# Patient Record
Sex: MALE | Race: WHITE
[De-identification: names, ages, dates, MRNs, and addresses within clinical notes are randomized per-mention and may not be internally consistent; named-entity substitution may affect disease eponyms.]

---

## 2017-05-07 ENCOUNTER — HOSPITAL ENCOUNTER (EMERGENCY)
Dept: HOSPITAL 11 - JP.ED | Age: 32
Discharge: HOME | End: 2017-05-07
Payer: SELF-PAY

## 2017-05-07 VITALS — DIASTOLIC BLOOD PRESSURE: 95 MMHG | SYSTOLIC BLOOD PRESSURE: 143 MMHG

## 2017-05-07 DIAGNOSIS — Z98.890: ICD-10-CM

## 2017-05-07 DIAGNOSIS — J01.90: Primary | ICD-10-CM

## 2017-05-07 NOTE — EDM.PDOC
ED HPI GENERAL MEDICAL PROBLEM





- General


Chief Complaint: General


Stated Complaint: SINUS INFECTION?


Time Seen by Provider: 05/07/17 17:42


Source of Information: Reports: Patient


History Limitations: Reports: No limitations





- History of Present Illness


INITIAL COMMENTS - FREE TEXT/NARRATIVE: 


Pt reports approx 5 days of worsening of headache behind the eyes, sinus 

pressure. Pt worried about leaving for trip tomorrow and not able to have 

something for treatment. Needs antibiotics less than 1 time per year for the 

same. No fever, sore throat, ear pain or vision changes. Intermittent 

productive cough.


Onset: gradual


Duration: Day(s):, Getting worse


Location: Reports: head, face


Quality: Reports: Pressure, Same as previous episode


Severity: mild


Improves with: Reports: Medication (ibuprofen helps headache but does not 

relieve remainder of symptoms.)


Associated Symptoms: Reports: denies other symptoms


Treatments PTA: Reports: NSAIDS


  ** denies


Pain Score (Numeric/FACES): 0





- Related Data


 Allergies











Allergy/AdvReac Type Severity Reaction Status Date / Time


 


No Known Allergies Allergy   Verified 05/07/17 17:39











Home Meds: 


 Home Meds





NK [No Known Home Meds]  06/21/14 [History]











Past Medical History





- Past Surgical History


GI Surgical History: Reports: Hernia repair/other





Social & Family History





- Tobacco Use


Smoking Status *Q: Never Smoker


Years of Tobacco use: 8


Second Hand Smoke Exposure: No





- Alcohol Use


Days Per Week of Alcohol Use: 0


Number of Drinks Per Day: 4


Total Drinks Per Week: 0





- Recreational Drug Use


Recreational Drug Use: No





ED ROS GENERAL





- Review of Systems


Review Of Systems: ROS reveals no pertinent complaints other than HPI.





ED EXAM, GENERAL





- Physical Exam


Exam: See Below


Exam Limited By: No limitations


General Appearance: alert, WD/WN, no apparent distress


Eye Exam: bilateral eye: normal inspection, PERRL


Ears: normal external exam, normal canal, hearing grossly normal, normal TMs


Nose: nasal drainage, other (post nasal drainage).  No: normal mucosa


Throat/Mouth: Normal inspection, Normal teeth, Normal oropharynx, Normal voice, 

No airway compromise


Head: atraumatic, normocephalic, other (allergic shiners noted)


Neck: normal inspection, supple, non-tender, full range of motion


Respiratory/Chest: no respiratory distress, lungs clear, normal breath sounds


Cardiovascular: regular rate, rhythm


Neurological: alert, oriented, CN II-XII intact, normal cognition, normal gait, 

no motor/sensory deficits


Psychiatric: normal affect, normal mood


Skin Exam: Warm, Dry, Intact





Course





- Vital Signs


Last Recorded V/S: 


 Last Vital Signs











Temp  36.7 C   05/07/17 17:32


 


Pulse  75   05/07/17 17:32


 


Resp  16   05/07/17 17:32


 


BP  143/95 H  05/07/17 17:32


 


Pulse Ox  98   05/07/17 17:32














Departure





- Departure


Time of Disposition: 17:52


Disposition: Home, Self-Care 01


Condition: good


Clinical Impression: 


 Acute sinusitis





Instructions:  Sinusitis, Adult, Easy-to-Read


Referrals: 


PCP,None [Primary Care Provider] - 


Forms:  ED Department Discharge


Additional Instructions: 


1. Continue your ibuprofen as needed for headache.


2. Amoxicillin 1000mg BID for 10 days. [facility] Rx sent per pt request. 


3. See your primary care provider as needed if symptoms do not resolve.





- Problem List & Annotations


(1) Acute sinusitis


SNOMED Code(s): 99087273


   Code(s): J01.90 - ACUTE SINUSITIS, UNSPECIFIED   Status: Acute   Priority: 

Medium   Current Visit: Yes   





- Problem List Review


Problem List Initiated/Reviewed/Updated: Yes

## 2019-08-14 ENCOUNTER — HOSPITAL ENCOUNTER (EMERGENCY)
Dept: HOSPITAL 11 - JP.ED | Age: 34
Discharge: HOME | End: 2019-08-14
Payer: COMMERCIAL

## 2019-08-14 VITALS — HEART RATE: 99 BPM | SYSTOLIC BLOOD PRESSURE: 142 MMHG | DIASTOLIC BLOOD PRESSURE: 87 MMHG

## 2019-08-14 DIAGNOSIS — H10.13: ICD-10-CM

## 2019-08-14 DIAGNOSIS — J30.9: ICD-10-CM

## 2019-08-14 DIAGNOSIS — J98.01: Primary | ICD-10-CM

## 2019-08-14 DIAGNOSIS — Z87.891: ICD-10-CM

## 2019-08-14 NOTE — EDM.PDOC
ED HPI GENERAL MEDICAL PROBLEM





- General


Chief Complaint: General


Stated Complaint: COUGH


Time Seen by Provider: 08/14/19 19:20


Source of Information: Reports: Patient, Old Records, RN


History Limitations: Reports: No Limitations





- History of Present Illness


INITIAL COMMENTS - FREE TEXT/NARRATIVE: 





35 yo non-smoking male with no pHx of asthma presents with a progressive cough 

for the past 2 weeks. No fever. Sx's wax and wane, but trend toward worsening. 

No FHx of asthma. Has been around a lot of dust lately. Clear nasal discharge.


Onset: Gradual


Onset Date: 08/01/19


Duration: Week(s):, Waxing/Waning


Location: Reports: Chest


Quality: Reports: Other (mild tightness at times)


Severity: Moderate


Improves with: Reports: None


Worsens with: Reports: Other (? dust exposure)


Context: Reports: Other (See HPI)


Associated Symptoms: Reports: Cough.  Denies: Fever/Chills, Shortness of Breath


Treatments PTA: Reports: Other (see below) (none)





- Related Data


 Allergies











Allergy/AdvReac Type Severity Reaction Status Date / Time


 


No Known Allergies Allergy   Verified 05/07/17 17:39











Home Meds: 


 Home Meds





NK [No Known Home Meds]  06/21/14 [History]











Past Medical History





- Past Surgical History


GI Surgical History: Reports: Hernia Repair/Other





Social & Family History





- Tobacco Use


Smoking Status *Q: Former Smoker


Used Tobacco, but Quit: Yes


Month/Year Tobacco Last Used: 624442





- Caffeine Use


Caffeine Use: Reports: Coffee, Energy Drinks


Caffeine Use Comment: daily coffee drinks, a couple energy drinks per week





- Alcohol Use


Days Per Week of Alcohol Use: 3


Number of Drinks Per Day: 5


Total Drinks Per Week: 15





- Recreational Drug Use


Recreational Drug Use: No





ED ROS GENERAL





- Review of Systems


Review Of Systems: See Below


Constitutional: Reports: No Symptoms


HEENT: Reports: Rhinitis


Respiratory: Reports: Wheezing, Cough.  Denies: Shortness of Breath, Sputum, 

Hemoptysis


Cardiovascular: Reports: No Symptoms


Endocrine: Reports: No Symptoms


GI/Abdominal: Reports: No Symptoms


: Reports: No Symptoms


Musculoskeletal: Reports: No Symptoms


Skin: Reports: No Symptoms


Neurological: Reports: No Symptoms





ED EXAM, GENERAL





- Physical Exam


Exam: See Below


Exam Limited By: No Limitations


General Appearance: Alert, WD/WN, No Apparent Distress


Eye Exam: Bilateral Eye: Normal Inspection


Ears: Normal External Exam, Normal Canal, Hearing Grossly Normal, Normal TMs


Ear Exam: Bilateral Ear: Auricle Normal, Canal Normal, TM normal


Nose: Normal Inspection, No Blood


Throat/Mouth: Normal Inspection, Normal Lips, Normal Oropharynx, Normal Voice, 

No Airway Compromise


Head: Atraumatic, Normocephalic


Neck: Normal Inspection


Respiratory/Chest: No Respiratory Distress, No Accessory Muscle Use, Wheezing, 

Other (Frequent dry cough)


Cardiovascular: Regular Rate, Rhythm, No Edema


Neurological: Alert, Oriented, CN II-XII Intact, Normal Cognition, No Motor/

Sensory Deficits


Psychiatric: Normal Affect, Normal Mood


Skin Exam: Warm, Dry, Intact, Normal Color, No Rash





Course





- Vital Signs


Text/Narrative:: 





Much improved after albuterol.


Last Recorded V/S: 


 Last Vital Signs











Temp  35.2 C L  08/14/19 19:19


 


Pulse  99   08/14/19 19:19


 


Resp  16   08/14/19 19:19


 


BP  142/87 H  08/14/19 19:19


 


Pulse Ox  96   08/14/19 19:19














- Orders/Labs/Meds


Orders: 


 Active Orders 24 hr











 Category Date Time Status


 


 RT Aerosol Therapy [RC] ASDIRECTED Care  08/14/19 19:32 Active











Meds: 


Medications














Discontinued Medications














Generic Name Dose Route Start Last Admin





  Trade Name Crain  PRN Reason Stop Dose Admin


 


Albuterol  2.5 mg  08/14/19 19:31  08/14/19 19:36





  Proventil Neb Soln  NEB  08/14/19 19:32  2.5 mg





  ONETIME ONE   Administration





     





     





     





     














Departure





- Departure


Time of Disposition: 20:00


Disposition: Home, Self-Care 01


Condition: Fair


Clinical Impression: 


 Bronchospasm





Allergic rhinitis


Qualifiers:


 Allergic rhinitis trigger: unspecified Allergic rhinitis seasonality: 

unspecified Qualified Code(s): J30.9 - Allergic rhinitis, unspecified





Allergic conjunctivitis


Qualifiers:


 Laterality: bilateral Qualified Code(s): H10.13 - Acute atopic conjunctivitis, 

bilateral








- Discharge Information


*PRESCRIPTION DRUG MONITORING PROGRAM REVIEWED*: No


*COPY OF PRESCRIPTION DRUG MONITORING REPORT IN PATIENT POWER: No


Instructions:  Allergies, Adult, Easy-to-Read, Asthma Attack Prevention, Adult


Referrals: 


PCP,None [Primary Care Provider] - 


Forms:  ED Department Discharge


Additional Instructions: 


Take prednisone and albuterol inhaler as directed. Also, take cetirizine 10 mg 

every day. Recheck with your provider within the week. Return if a lot worse. 





- My Orders


Last 24 Hours: 


My Active Orders





08/14/19 19:32


RT Aerosol Therapy [RC] ASDIRECTED 














- Assessment/Plan


Last 24 Hours: 


My Active Orders





08/14/19 19:32


RT Aerosol Therapy [RC] ASDIRECTED